# Patient Record
Sex: FEMALE | Race: WHITE | NOT HISPANIC OR LATINO | URBAN - METROPOLITAN AREA
[De-identification: names, ages, dates, MRNs, and addresses within clinical notes are randomized per-mention and may not be internally consistent; named-entity substitution may affect disease eponyms.]

---

## 2019-12-20 ENCOUNTER — OFFICE (OUTPATIENT)
Dept: URBAN - METROPOLITAN AREA CLINIC 101 | Facility: CLINIC | Age: 34
End: 2019-12-20

## 2019-12-20 VITALS
DIASTOLIC BLOOD PRESSURE: 70 MMHG | WEIGHT: 281 LBS | TEMPERATURE: 97.9 F | HEART RATE: 71 BPM | HEIGHT: 66 IN | SYSTOLIC BLOOD PRESSURE: 108 MMHG

## 2019-12-20 DIAGNOSIS — K57.33 DIVERTICULITIS OF LARGE INTESTINE WITHOUT PERFORATION OR ABS: ICD-10-CM

## 2019-12-20 DIAGNOSIS — K59.09 OTHER CONSTIPATION: ICD-10-CM

## 2019-12-20 PROCEDURE — 99204 OFFICE O/P NEW MOD 45 MIN: CPT

## 2019-12-20 NOTE — SERVICEHPINOTES
JEANE FARRELL   is a   34  female who presents for ER follow up. Visited ER on 12/15/19 with lower abdominal pain. CT of abdomen and pelvis showed mild sigmoid diverticulitis without perforation or abscess. Prescribed with levoflaxacin and flagyl. The pain has improved but still has discomfort. Developed constipation, no BM since Monday. Eating regular foods now. Denies blood in stool, melena or weight loss. BRDenies fever, chills, nausea, vomiting, acid reflux, dysphagia, dyspepsia or early satiety. BRThis was the first episode of diverticulitis.Denies chest pain, palpitations or sob with exertion. BRDenies family hx of colon cancer. BR

## 2020-02-21 ENCOUNTER — ON CAMPUS - OUTPATIENT (OUTPATIENT)
Dept: URBAN - METROPOLITAN AREA HOSPITAL 35 | Facility: HOSPITAL | Age: 35
End: 2020-02-21

## 2020-02-21 DIAGNOSIS — K63.5 POLYP OF COLON: ICD-10-CM

## 2020-02-21 DIAGNOSIS — K59.09 OTHER CONSTIPATION: ICD-10-CM

## 2020-02-21 PROCEDURE — 45380 COLONOSCOPY AND BIOPSY: CPT

## 2021-04-13 ENCOUNTER — OFFICE (OUTPATIENT)
Dept: URBAN - METROPOLITAN AREA TELEHEALTH 12 | Facility: TELEHEALTH | Age: 36
End: 2021-04-13
Payer: COMMERCIAL

## 2021-04-13 VITALS — HEIGHT: 66 IN | WEIGHT: 240 LBS

## 2021-04-13 DIAGNOSIS — A09 INFECTIOUS GASTROENTERITIS AND COLITIS, UNSPECIFIED: ICD-10-CM

## 2021-04-13 DIAGNOSIS — K59.09 OTHER CONSTIPATION: ICD-10-CM

## 2021-04-13 PROCEDURE — 99213 OFFICE O/P EST LOW 20 MIN: CPT | Performed by: INTERNAL MEDICINE

## 2021-04-13 NOTE — SERVICEHPINOTES
PATIENT VERIFIED BY DATE OF BIRTH AND NAME. Patient has been consented for this telecommunication visit.   JEANE FARRELL   is a   36  female with h/o diverticulitis who presents for follow up after ED visit in March 2021. She was in usual state of health until morning of 3/13. She had gone out to eat on night of 3/12. She woke up at 4 AM on 3/13 with abdominal pain, nausea/vomiting, and diarrhea. Nausea/vomiting seemed to slow by 6 PM however bloody diarrhea started around 3 PM and continued until the next morning when she went to ED. She denied any melena, recent unintentional weight loss. She had WBC of 17K and CT with left sided colitis. She was put on antibiotics for 1 weeks and slowly improved. She stuck to a bland diet for 2 weeks. She has felt well since. She has had recent larger slightly less frequent BMs but had changed her diet to lose weight so she thinks it could be related. No current abdominal pain, nausea, vomiting, diarrhea, constipation, melena, rectal bleeding. Colonoscopy done last year.  ROS as above, otherwise remainder of ROS is negative.